# Patient Record
Sex: MALE | Race: BLACK OR AFRICAN AMERICAN | NOT HISPANIC OR LATINO | Employment: FULL TIME | URBAN - METROPOLITAN AREA
[De-identification: names, ages, dates, MRNs, and addresses within clinical notes are randomized per-mention and may not be internally consistent; named-entity substitution may affect disease eponyms.]

---

## 2018-07-23 ENCOUNTER — OFFICE VISIT (OUTPATIENT)
Dept: ORTHOPEDICS | Facility: CLINIC | Age: 43
End: 2018-07-23
Payer: COMMERCIAL

## 2018-07-23 VITALS — WEIGHT: 250 LBS | BODY MASS INDEX: 37.03 KG/M2 | HEIGHT: 69 IN

## 2018-07-23 DIAGNOSIS — S86.112A GASTROCNEMIUS MUSCLE STRAIN, LEFT, INITIAL ENCOUNTER: Primary | ICD-10-CM

## 2018-07-23 PROCEDURE — 99999 PR PBB SHADOW E&M-EST. PATIENT-LVL II: CPT | Mod: PBBFAC,,, | Performed by: ORTHOPAEDIC SURGERY

## 2018-07-23 PROCEDURE — 3008F BODY MASS INDEX DOCD: CPT | Mod: CPTII,S$GLB,, | Performed by: ORTHOPAEDIC SURGERY

## 2018-07-23 PROCEDURE — 99202 OFFICE O/P NEW SF 15 MIN: CPT | Mod: S$GLB,,, | Performed by: ORTHOPAEDIC SURGERY

## 2018-07-23 NOTE — PROGRESS NOTES
Subjective:      Patient ID: Cole Partida is a 43 y.o. male.    Chief Complaint: Pain of the Left Lower Leg    HPI  The patient complains of 2 weeks left leg pain. He felt a pop in his calf while pushing an automobile.  He had progressive pain and swelling. No specific treatment.  Symptoms have improved to a moderate degree.  Review of Systems   Constitution: Negative for fever and weight loss.   HENT: Negative for congestion.    Eyes: Negative for visual disturbance.   Cardiovascular: Negative for chest pain.   Respiratory: Negative for shortness of breath.    Hematologic/Lymphatic: Negative for bleeding problem. Does not bruise/bleed easily.   Skin: Negative for poor wound healing.   Gastrointestinal: Negative for abdominal pain.   Genitourinary: Negative for dysuria.   Neurological: Negative for seizures.   Psychiatric/Behavioral: Negative for altered mental status.   Allergic/Immunologic: Negative for persistent infections.         Objective:            Ortho/SPM Exam  Normally developed man in no acute distress  No definite limp  Left calf has normal skin.  There is mild swelling and ecchymosis distally  The origin of the medial gastrocnemius is tender  The Achilles is nontender  There is full active range of motion in the ankle and knee  Sorto test is negative for rupture  Neurovascular exam is normal          Assessment:       Encounter Diagnosis   Name Primary?    Gastrocnemius muscle strain, left, initial encounter Yes          Plan:       Cole was seen today for pain.    Diagnoses and all orders for this visit:    Gastrocnemius muscle strain, left, initial encounter      I explained my diagnostic impression and the reasoning behind it in detail, using layman's terms.  Natural history explained    Compression stockings recommended and usage explained    Preoperative elevation    Home exercise program discussed    Activity modification explained